# Patient Record
Sex: FEMALE | URBAN - METROPOLITAN AREA
[De-identification: names, ages, dates, MRNs, and addresses within clinical notes are randomized per-mention and may not be internally consistent; named-entity substitution may affect disease eponyms.]

---

## 2022-08-24 ENCOUNTER — APPOINTMENT (RX ONLY)
Dept: URBAN - METROPOLITAN AREA CLINIC 26 | Facility: CLINIC | Age: 42
Setting detail: DERMATOLOGY
End: 2022-08-24

## 2022-08-24 DIAGNOSIS — L64.8 OTHER ANDROGENIC ALOPECIA: ICD-10-CM

## 2022-08-24 DIAGNOSIS — L81.1 CHLOASMA: ICD-10-CM

## 2022-08-24 PROBLEM — L65.9 NONSCARRING HAIR LOSS, UNSPECIFIED: Status: ACTIVE | Noted: 2022-08-24

## 2022-08-24 PROCEDURE — ? TREATMENT REGIMEN

## 2022-08-24 PROCEDURE — 99204 OFFICE O/P NEW MOD 45 MIN: CPT

## 2022-08-24 PROCEDURE — ? ORDER TESTS

## 2022-08-24 PROCEDURE — ? COUNSELING

## 2022-08-24 PROCEDURE — ? SKIN MEDICINALS

## 2022-08-24 ASSESSMENT — LOCATION DETAILED DESCRIPTION DERM
LOCATION DETAILED: RIGHT INFERIOR CENTRAL MALAR CHEEK
LOCATION DETAILED: LEFT INFERIOR CENTRAL MALAR CHEEK
LOCATION DETAILED: RIGHT MEDIAL FRONTAL SCALP

## 2022-08-24 ASSESSMENT — LOCATION SIMPLE DESCRIPTION DERM
LOCATION SIMPLE: RIGHT SCALP
LOCATION SIMPLE: LEFT CHEEK
LOCATION SIMPLE: RIGHT CHEEK

## 2022-08-24 ASSESSMENT — LOCATION ZONE DERM
LOCATION ZONE: FACE
LOCATION ZONE: SCALP

## 2022-08-24 NOTE — HPI: DISCOLORATION
How Severe Is Your Skin Discoloration?: mild
Additional History: Tried tretinoin, hydroquinone, azelaic acid

## 2022-08-24 NOTE — PROCEDURE: TREATMENT REGIMEN
Detail Level: Zone
Initiate Treatment: Minoxidil 5% BID (not pregnant, breastfeeding or planning pregnancy)

## 2022-08-24 NOTE — PROCEDURE: ORDER TESTS
Bill For Surgical Tray: no
Performing Laboratory: [id number]
Expected Date Of Service: 08/24/2022
Billing Type: Third-Party Bill

## 2022-08-24 NOTE — PROCEDURE: SKIN MEDICINALS
Sig: Apply to affected areas twice daily
Sig: Apply nightly to warts nightly under occlusion
Sig: Take one twice daily
Sig: Apply a thin layer to the affected areas daily
Sig: Apply to affected areas on face twice daily
Sig: Apply pea sized amount per area at night
Sig: Apply a thin layer to the itching areas twice daily as needed
Sig: Apply twice daily for 4-7 days to scalp and lateral cheeks, hands, forearms, and posterior neck.
Sig: Apply a thin layer to the affected areas twice daily
Sig: Wash affected areas daily.
Sig: Apply a thin layer to the affected skin twice daily
Intro Statement: I recommended the following products:
Detail Level: Zone
Sig: Apply twice daily for 5 days
Sig: Apply pea sized amount per area at night x2-3 months as needed then discontinue.
Sig: Apply a thin layer to the scar daily
Product Type (1): Lightening Cream
Lightening Cream: Hydroquinone 8%, Tretinoin 0.1%, Kojic Acid 1%, Niacinamide 4%, Fluocinolone 0.025% Cream